# Patient Record
Sex: FEMALE | Race: OTHER | Employment: FULL TIME | URBAN - NONMETROPOLITAN AREA
[De-identification: names, ages, dates, MRNs, and addresses within clinical notes are randomized per-mention and may not be internally consistent; named-entity substitution may affect disease eponyms.]

---
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
## 2021-01-17 ENCOUNTER — HOSPITAL ENCOUNTER (EMERGENCY)
Age: 21
Discharge: HOME | End: 2021-01-17
Attending: FAMILY MEDICINE
Payer: MEDICAID

## 2021-01-17 VITALS
BODY MASS INDEX: 22.99 KG/M2 | TEMPERATURE: 98.1 F | HEART RATE: 88 BPM | SYSTOLIC BLOOD PRESSURE: 114 MMHG | HEIGHT: 60 IN | WEIGHT: 117.1 LBS | RESPIRATION RATE: 18 BRPM | DIASTOLIC BLOOD PRESSURE: 71 MMHG | OXYGEN SATURATION: 97 %

## 2021-01-17 DIAGNOSIS — Z86.69 HX OF MIGRAINES: ICD-10-CM

## 2021-01-17 DIAGNOSIS — B34.9 NONSPECIFIC SYNDROME SUGGESTIVE OF VIRAL ILLNESS: Primary | ICD-10-CM

## 2021-01-17 PROCEDURE — 96372 THER/PROPH/DIAG INJ SC/IM: CPT

## 2021-01-17 PROCEDURE — U0003 INFECTIOUS AGENT DETECTION BY NUCLEIC ACID (DNA OR RNA); SEVERE ACUTE RESPIRATORY SYNDROME CORONAVIRUS 2 (SARS-COV-2) (CORONAVIRUS DISEASE [COVID-19]), AMPLIFIED PROBE TECHNIQUE, MAKING USE OF HIGH THROUGHPUT TECHNOLOGIES AS DESCRIBED BY CMS-2020-01-R: HCPCS

## 2021-01-17 PROCEDURE — 99283 EMERGENCY DEPT VISIT LOW MDM: CPT

## 2021-01-17 PROCEDURE — C9803 HOPD COVID-19 SPEC COLLECT: HCPCS

## 2021-01-17 PROCEDURE — 6360000002 HC RX W HCPCS: Performed by: FAMILY MEDICINE

## 2021-01-17 RX ORDER — KETOROLAC TROMETHAMINE 30 MG/ML
60 INJECTION, SOLUTION INTRAMUSCULAR; INTRAVENOUS ONCE
Status: COMPLETED | OUTPATIENT
Start: 2021-01-17 | End: 2021-01-17

## 2021-01-17 RX ADMIN — KETOROLAC TROMETHAMINE 60 MG: 30 INJECTION, SOLUTION INTRAMUSCULAR at 19:13

## 2021-01-17 ASSESSMENT — PAIN DESCRIPTION - DESCRIPTORS: DESCRIPTORS: ACHING

## 2021-01-17 ASSESSMENT — ENCOUNTER SYMPTOMS
DIARRHEA: 0
VOMITING: 1
COUGH: 1

## 2021-01-17 ASSESSMENT — PAIN DESCRIPTION - LOCATION: LOCATION: HEAD

## 2021-01-18 ENCOUNTER — CARE COORDINATION (OUTPATIENT)
Dept: CARE COORDINATION | Age: 21
End: 2021-01-18

## 2021-01-18 NOTE — CARE COORDINATION
ED visit for viral illness, migraine history. Still has some sore throat but denied all other symptoms she had yesterday (chills, weakness, headache, fatigue, n/v, cough). She is quarantining until [facility] results back. Oral fluids encouraged. Discussed symptoms that require return to ED. Has no PCP and declined offer to schedule appt or info to find new PCP. Patient contacted regarding COVID-19 symptoms. Discussed COVID-19 related testing which was pending at this time. Test results were pending. Patient informed of results, if available? NA-still pending. Care Transition Nurse/ Ambulatory Care Manager contacted the patient by telephone to perform post discharge assessment. Call within 2 business days of discharge: Yes. Verified name and  with patient as identifiers. Provided introduction to self, and explanation of the CTN/ACM role, and reason for call due to risk factors for infection and/or exposure to COVID-19. Symptoms reviewed with patient who verbalized the following symptoms: sore throat. Due to no new or worsening symptoms encounter was not routed to provider for escalation. Discussed follow-up appointments. If no appointment was previously scheduled, appointment scheduling offered: Yes  [facility] follow up appointment(s): No future appointments. Non-[facility] follow up appointment(s): NA    Non-face-to-face services provided:  Reviewed and followed up on pending diagnostic tests and treatments-COVID test  Education of patient/family/caregiver/guardian to support self-management-symptom monitoring and treatment     Advance Care Planning:   Does patient have an Advance Directive:  reviewed and current. Patient has following risk factors of: no known risk factors. CTN/ACM reviewed discharge instructions, medical action plan and red flags such as increased shortness of breath, increasing fever and signs of decompensation with patient who verbalized understanding.    Discussed exposure protocols and quarantine with CDC Guidelines What to do if you are sick with coronavirus disease 2019.  Patient was given an opportunity for questions and concerns. The patient agrees to contact the [facility] exposure line [phone], local [facility] department [place] [facility]: ([phone]) and PCP office for questions related to their healthcare. CTN/ACM provided contact information for future needs. Reviewed and educated patient on any new and changed medications related to discharge diagnosis     Patient/family/caregiver given information for Get[facility] Loop and agrees to enroll yes  Patient's preferred e-mail: NA   Patient's preferred phone number: [phone]  Based on Loop alert triggers, patient will be contacted by nurse care manager for worsening symptoms. Pt will be further monitored by COVID Loop Team based on severity of symptoms and risk factors.

## 2021-01-18 NOTE — ED PROVIDER NOTES
is 98.1 °F (36.7 °C). Her blood pressure is 114/71 and her pulse is 88. Her respiration is 18 and oxygen saturation is 97%. Physical Exam   Constitutional: Patient is oriented to person, place, and time. Patient appears well-developed and well-nourished. Patient is active and cooperative. HENT:   Head: Normocephalic and atraumatic. Head is without contusion. Right Ear: Hearing and external ear normal. No drainage. TM obscured by cerumen  Left Ear: Hearing and external ear normal. No drainage. Clear TM  Nose: Nose normal. No nasal deformity. No epistaxis. Mouth/Throat: Mucous membranes are not dry. Negative oral lesions or exudate, noted some posterior pharyngeal erythema and cobblestoning, some postnasal drainage  Eyes: EOMI. Conjunctivae, sclera, and lids are normal. Right eye exhibits no discharge. Left eye exhibits no discharge. Neck: Full passive range of motion without pain and phonation normal.   Cardiovascular:  Normal rate, regular rhythm and intact distal pulses. No edema. Pulses: Right radial pulse  2+   Pulmonary/Chest: Effort normal. No tachypnea and no bradypnea. No wheezes, rhonchi, or rales. Abdominal: Soft. Patient without distension   Musculoskeletal:   Negative acute trauma or deformity,  apparent full range of motion and normal strength all extremities appropriate to age. Neurological: Patient is alert and oriented to person, place, and time. patient displays no tremor. Patient displays no seizure activity. Normal observed gait  Skin: Skin is warm and dry. Patient is not diaphoretic. Psychiatric: Patient has a normal mood and affect.  Patient speech is normal and behavior is normal. Cognition and memory are normal.    DIFFERENTIAL DIAGNOSIS:   Migraine/cephalalgia, sinusitis, viral illness NOS    DIAGNOSTIC RESULTS         RADIOLOGY: non-plain film images(s) such as CT, Ultrasound and MRI are read by the radiologist.  No orders to display       LABS:   Labs Reviewed   COVID-19 EMERGENCY DEPARTMENT COURSE:   Vitals:    Vitals:    01/17/21 1854   BP: 114/71   Pulse: 88   Resp: 18   Temp: 98.1 °F (36.7 °C)   TempSrc: Oral   SpO2: 97%   Weight: 117 lb 1.6 oz (53.1 kg)   Height: 5' (1.524 m)     Patient history and physical exam taken at bedside, discussed patient symptoms and exam findings, discussed patient's concerns for possible COVID-19, I do note patient has normal pulse oximetry with good waveform, afebrile without antipyretics potentially lowering her temperature, patient is neither tachypneic nor tachycardic, no increased work of breathing. Discussed getting Covid swab will be outpatient at this time, with expected results in 3 to 5 days, in the interim patient is advised to self isolate, discussed Tylenol for fevers, OTC cough cold medications with may be beneficial, patient acknowledges and agrees. This patient has a history of migraines, although she feels this is more of a headache and indicates an area between her nose and slight behind her eyes, will give ketorolac 60 mg IM for headache/history of migraines, patient agreeable to this. FINAL IMPRESSION      1. Nonspecific syndrome suggestive of viral illness    2. Hx of migraines          DISPOSITION/PLAN   D/c    PATIENT REFERRED TO:  No follow-up provider specified. DISCHARGE MEDICATIONS:  Discharge Medication List as of 1/17/2021  7:20 PM              Summation      Patient Course:  D/c    ED Medications administered this visit:    Medications   ketorolac (TORADOL) injection 60 mg (60 mg Intramuscular Given 1/17/21 1913)       New Prescriptions from this visit:    Discharge Medication List as of 1/17/2021  7:20 PM          Follow-up:  No follow-up provider specified. Final Impression:   1. Nonspecific syndrome suggestive of viral illness    2.  Hx of migraines               (Please note that portions of this note were completed with a voice recognition program.  Efforts were made to edit the dictations but occasionally words are mis-transcribed.)    MD Mckenzie Crespo MD  01/17/21 2010

## 2021-01-20 LAB — SARS-COV-2, NAA: NOT DETECTED

## 2021-03-13 ENCOUNTER — HOSPITAL ENCOUNTER (EMERGENCY)
Age: 21
Discharge: HOME | End: 2021-03-13
Attending: FAMILY MEDICINE
Payer: MEDICAID

## 2021-03-13 VITALS
HEIGHT: 60 IN | OXYGEN SATURATION: 99 % | SYSTOLIC BLOOD PRESSURE: 99 MMHG | HEART RATE: 75 BPM | DIASTOLIC BLOOD PRESSURE: 60 MMHG | WEIGHT: 127.7 LBS | TEMPERATURE: 98.2 F | BODY MASS INDEX: 25.07 KG/M2 | RESPIRATION RATE: 20 BRPM

## 2021-03-13 DIAGNOSIS — R42 DIZZINESS: Primary | ICD-10-CM

## 2021-03-13 DIAGNOSIS — Z20.822 ENCOUNTER FOR LABORATORY TESTING FOR COVID-19 VIRUS: ICD-10-CM

## 2021-03-13 PROCEDURE — U0005 INFEC AGEN DETEC AMPLI PROBE: HCPCS

## 2021-03-13 PROCEDURE — 99284 EMERGENCY DEPT VISIT MOD MDM: CPT

## 2021-03-13 PROCEDURE — U0003 INFECTIOUS AGENT DETECTION BY NUCLEIC ACID (DNA OR RNA); SEVERE ACUTE RESPIRATORY SYNDROME CORONAVIRUS 2 (SARS-COV-2) (CORONAVIRUS DISEASE [COVID-19]), AMPLIFIED PROBE TECHNIQUE, MAKING USE OF HIGH THROUGHPUT TECHNOLOGIES AS DESCRIBED BY CMS-2020-01-R: HCPCS

## 2021-03-13 NOTE — LETTER
NOTIFICATION RETURN TO WORK / SCHOOL    3/13/2021    Ms. Konrad Petit  [place] [place] [address]      To Whom It May Concern:    Konrad Petit was tested for COVID-19 on March 13, 2021. She may return to work pending her results. If there are questions or concerns, please have the patient contact our office.         Sincerely,      Darlene Cosme RN

## 2021-03-13 NOTE — ED PROVIDER NOTES
[facility]  eMERGENCY dEPARTMENT eNCOUnter          [address] [facility]       Chief Complaint   Patient presents with    Dizziness     Pt states for the past hour she has been dizzy/seeing black spots at [facility] & she was told to come into the ER for a Covid test.        Nurses Notes reviewed and I agree except as noted in the HPI. HISTORY OF PRESENT ILLNESS    Shary Severe is a 24 y.o. female who presents the emergency room via private vehicle, patient states she was at work became somewhat dizzy, described a lightheaded sensation, denies any vertigo type symptoms, states at times especially when she got up she saw some black spots in her vision. Denies loss of consciousness denies vomiting or diarrhea. Patient thinks it is because she stands up or 10 hours a day in the same position. Denies any cough fever, denies possibility of pregnancy. Patient also states she was told by her work that she has to get Covid testing, that she would have to get the outpatient swab, denies any Covid symptoms otherwise. REVIEW OF SYSTEMS     Review of Systems   HENT: Negative for tinnitus. Eyes: Negative for visual disturbance. Neurological: Positive for dizziness. Negative for seizures and syncope. All other systems reviewed and are negative. PAST MEDICAL HISTORY    has no past medical history on file. SURGICAL HISTORY      has a past surgical history that includes  section. CURRENT MEDICATIONS       Previous Medications    No medications on file       ALLERGIES     has No Known Allergies. FAMILY HISTORY     has no family status information on file. family history is not on file. SOCIAL HISTORY      reports that she is a non-smoker but has been exposed to tobacco smoke. She has never used smokeless tobacco. She reports previous alcohol use. PHYSICAL EXAM     INITIAL VITALS:  height is 5' (1.524 m) and weight is 127 lb 11.2 oz (57.9 kg).  Her oral temperature is 98.2 °F (36.8 °C). Her blood pressure is 96/61 and her pulse is 67. Her respiration is 20 and oxygen saturation is 99%. Physical Exam   Constitutional: Patient is oriented to person, place, and time. Patient appears well-developed and well-nourished. Patient is active and cooperative. HENT:   Head: Normocephalic and atraumatic. Head is without contusion. Right Ear: Hearing and external ear normal. No drainage. Left Ear: Hearing and external ear normal. No drainage. Nose: Nose normal. No nasal deformity. No epistaxis. Mouth/Throat: Mucous membranes are not dry. Eyes: EOMI. Conjunctivae, sclera, and lids are normal. Right eye exhibits no discharge. Left eye exhibits no discharge. No nystagmus  Neck: Full passive range of motion without pain and phonation normal.   Cardiovascular:  Normal rate, regular rhythm and intact distal pulses. Pulses: Right radial pulse  2+   Pulmonary/Chest: Effort normal. No tachypnea and no bradypnea. No wheezes, rhonchi, or rales. Abdominal: Soft. Patient without distension or tenderness, there is a well-healed LTCS scar  Musculoskeletal:   Negative acute trauma or deformity,  apparent full range of motion and normal strength all extremities appropriate to age. Neurological: Patient is alert and oriented to person, place, and time. patient displays no tremor. Patient displays no seizure activity. Normal observed gait  Skin: Skin is warm and dry. Patient is not diaphoretic. Psychiatric: Patient has a normal mood and affect.  Patient speech is normal and behavior is normal. Cognition and memory are normal.    DIFFERENTIAL DIAGNOSIS:   Dehydration, orthostatic hypotension/dizziness    DIAGNOSTIC RESULTS           RADIOLOGY: non-plain film images(s) such as CT, Ultrasound and MRI are read by the radiologist.  No orders to display       LABS:   Labs Reviewed   COVID-19       EMERGENCY DEPARTMENT COURSE:   Vitals:    Vitals:    03/13/21 3397 03/13/21 8341 03/13/21 0559 03/13/21 0600   BP: (!) 108/59 104/64 100/61 96/61   Pulse: 67      Resp: 20      Temp: 98.2 °F (36.8 °C)      TempSrc: Oral      SpO2: 99%      Weight: 127 lb 11.2 oz (57.9 kg)      Height: 5' (1.524 m)        Patient history and physical exam taken at bedside, discussed patient symptoms and exam findings, discussed ability to get orthostatic vital signs and reevaluate, will get oral swab for Covid testing, explained that we would have to use sign of testing as patient does not meet criteria for rapid test with good pulse oximetry and no tachypnea or increased work of breathing, patient states she was told that she would have to have send out testing as well. Patient sitting upright in bed position of comfort, acknowledges    Orthostatic vital signs reviewed    This time for an otherwise healthy young woman, normal the static vital signs, normal physical exam findings, do not feel additional testing bruising warrant, I suspect patient likely not drinking of fluids at work, may be locking her knees when she stands, this was discussed with importance of hydration as well as eating spaced out meals out the day, not locking knees at work, at this time we will go ahead and discharge with outpatient follow-up, return to ER symptoms change worsen or other concerns, Covid testing results will be called the patient as well as posted to her [employer] system. FINAL IMPRESSION      1. Dizziness    2.  Encounter for laboratory testing for COVID-19 virus          DISPOSITION/PLAN   D/c    PATIENT REFERRED TO:  [facility] ED  [address] [address] [phone]    As needed      DISCHARGE MEDICATIONS:  New Prescriptions    No medications on file           Summation      Patient Course: d/c    ED Medications administered this visit:  Medications - No data to display    New Prescriptions from this visit:    New Prescriptions    No medications on file       Follow-up:  [facility] ED  [address] [facility] [address]  [phone]    As needed        Final Impression:   1. Dizziness    2.  Encounter for laboratory testing for COVID-19 virus               (Please note that portions of this note were completed with a voice recognition program.  Efforts were made to edit the dictations but occasionally words are mis-transcribed.)    MD Josie Cruz MD  03/13/21 0422

## 2021-03-15 ENCOUNTER — CARE COORDINATION (OUTPATIENT)
Dept: CARE COORDINATION | Age: 21
End: 2021-03-15

## 2021-03-15 LAB
SARS-COV-2: NORMAL
SARS-COV-2: NOT DETECTED
SOURCE: NORMAL

## 2021-03-15 NOTE — CARE COORDINATION
Reason For Call Today:  -Attempted to reach [facility] today for ED Follow Up/ COVID at risk monitoring following her ED visit to [facility] on 3/13/2021   -Female answered phone, however when ACAMY asked to speak with [facility] she said \"no its not\" call then ended. Only phone number listed for patient   -COVID: results pending at time of call   -My Chart:  Active     No further calls planned at this time due to invalid phone number

## 2021-09-06 ENCOUNTER — APPOINTMENT (OUTPATIENT)
Dept: GENERAL RADIOLOGY | Age: 21
End: 2021-09-06
Payer: MEDICAID

## 2021-09-06 ENCOUNTER — HOSPITAL ENCOUNTER (EMERGENCY)
Age: 21
Discharge: HOME | End: 2021-09-06
Attending: FAMILY MEDICINE
Payer: MEDICAID

## 2021-09-06 VITALS
HEART RATE: 77 BPM | OXYGEN SATURATION: 97 % | DIASTOLIC BLOOD PRESSURE: 95 MMHG | BODY MASS INDEX: 24.39 KG/M2 | TEMPERATURE: 99.5 F | SYSTOLIC BLOOD PRESSURE: 115 MMHG | WEIGHT: 124.9 LBS | RESPIRATION RATE: 16 BRPM

## 2021-09-06 DIAGNOSIS — J20.9 ACUTE BRONCHITIS, UNSPECIFIED ORGANISM: Primary | ICD-10-CM

## 2021-09-06 DIAGNOSIS — Z20.822 LAB TEST NEGATIVE FOR COVID-19 VIRUS: ICD-10-CM

## 2021-09-06 LAB
ABSOLUTE EOS #: 0.2 K/UL (ref 0–0.4)
ABSOLUTE IMMATURE GRANULOCYTE: ABNORMAL K/UL (ref 0–0.3)
ABSOLUTE LYMPH #: 2.4 K/UL (ref 1.2–5.2)
ABSOLUTE MONO #: 0.6 K/UL (ref 0–1)
ALBUMIN SERPL-MCNC: 4.7 G/DL (ref 3.5–5.2)
ALBUMIN/GLOBULIN RATIO: ABNORMAL (ref 1–2.5)
ALP BLD-CCNC: 64 U/L (ref 35–104)
ALT SERPL-CCNC: 13 U/L (ref 5–33)
ANION GAP SERPL CALCULATED.3IONS-SCNC: 11 MMOL/L (ref 9–17)
AST SERPL-CCNC: 17 U/L
BASOPHILS # BLD: 0 % (ref 0–2)
BASOPHILS ABSOLUTE: 0 K/UL (ref 0–0.2)
BILIRUB SERPL-MCNC: <0.1 MG/DL (ref 0.3–1.2)
BUN BLDV-MCNC: 13 MG/DL (ref 6–20)
BUN/CREAT BLD: 24 (ref 9–20)
CALCIUM SERPL-MCNC: 10.1 MG/DL (ref 8.6–10.4)
CHLORIDE BLD-SCNC: 103 MMOL/L (ref 98–107)
CO2: 26 MMOL/L (ref 20–31)
CREAT SERPL-MCNC: 0.55 MG/DL (ref 0.5–0.9)
D-DIMER QUANTITATIVE: 0.28 MG/L FEU (ref 0–0.59)
DIFFERENTIAL TYPE: YES
DIRECT EXAM: NORMAL
EOSINOPHILS RELATIVE PERCENT: 2 % (ref 0–5)
GFR AFRICAN AMERICAN: >60 ML/MIN
GFR NON-AFRICAN AMERICAN: >60 ML/MIN
GFR SERPL CREATININE-BSD FRML MDRD: ABNORMAL ML/MIN/{1.73_M2}
GFR SERPL CREATININE-BSD FRML MDRD: ABNORMAL ML/MIN/{1.73_M2}
GLUCOSE BLD-MCNC: 80 MG/DL (ref 70–99)
HCT VFR BLD CALC: 39.9 % (ref 36–46)
HEMOGLOBIN: 13.7 G/DL (ref 12–16)
IMMATURE GRANULOCYTES: ABNORMAL %
LYMPHOCYTES # BLD: 23 % (ref 15–40)
Lab: NORMAL
MCH RBC QN AUTO: 30.4 PG (ref 26–34)
MCHC RBC AUTO-ENTMCNC: 34.4 G/DL (ref 31–37)
MCV RBC AUTO: 88.3 FL (ref 80–100)
MONOCYTES # BLD: 5 % (ref 4–8)
MONONUCLEOSIS SCREEN: NEGATIVE
NRBC AUTOMATED: ABNORMAL PER 100 WBC
PDW BLD-RTO: 13.8 % (ref 12.1–15.2)
PLATELET # BLD: 279 K/UL (ref 140–450)
PLATELET ESTIMATE: ABNORMAL
PMV BLD AUTO: ABNORMAL FL (ref 6–12)
POTASSIUM SERPL-SCNC: 4.8 MMOL/L (ref 3.7–5.3)
RBC # BLD: 4.52 M/UL (ref 4–5.2)
RBC # BLD: ABNORMAL 10*6/UL
SARS-COV-2, RAPID: NOT DETECTED
SEG NEUTROPHILS: 70 % (ref 47–75)
SEGMENTED NEUTROPHILS ABSOLUTE COUNT: 7.2 K/UL (ref 2.5–7)
SODIUM BLD-SCNC: 140 MMOL/L (ref 135–144)
SPECIMEN DESCRIPTION: NORMAL
SPECIMEN DESCRIPTION: NORMAL
TOTAL PROTEIN: 7.5 G/DL (ref 6.4–8.3)
TROPONIN INTERP: NORMAL
TROPONIN T: NORMAL NG/ML
TROPONIN, HIGH SENSITIVITY: <6 NG/L (ref 0–14)
WBC # BLD: 10.3 K/UL (ref 4.5–13.5)
WBC # BLD: ABNORMAL 10*3/UL

## 2021-09-06 PROCEDURE — 86308 HETEROPHILE ANTIBODY SCREEN: CPT

## 2021-09-06 PROCEDURE — 85379 FIBRIN DEGRADATION QUANT: CPT

## 2021-09-06 PROCEDURE — 99283 EMERGENCY DEPT VISIT LOW MDM: CPT

## 2021-09-06 PROCEDURE — 87635 SARS-COV-2 COVID-19 AMP PRB: CPT

## 2021-09-06 PROCEDURE — 80053 COMPREHEN METABOLIC PANEL: CPT

## 2021-09-06 PROCEDURE — 85025 COMPLETE CBC W/AUTO DIFF WBC: CPT

## 2021-09-06 PROCEDURE — 87880 STREP A ASSAY W/OPTIC: CPT

## 2021-09-06 PROCEDURE — 71045 X-RAY EXAM CHEST 1 VIEW: CPT

## 2021-09-06 PROCEDURE — 36415 COLL VENOUS BLD VENIPUNCTURE: CPT

## 2021-09-06 PROCEDURE — 84484 ASSAY OF TROPONIN QUANT: CPT

## 2021-09-06 ASSESSMENT — PAIN DESCRIPTION - LOCATION: LOCATION: THROAT

## 2021-09-06 ASSESSMENT — PAIN SCALES - GENERAL: PAINLEVEL_OUTOF10: 7

## 2021-09-06 ASSESSMENT — PAIN DESCRIPTION - ONSET: ONSET: ON-GOING

## 2021-09-06 ASSESSMENT — PAIN DESCRIPTION - DESCRIPTORS: DESCRIPTORS: SHARP

## 2021-09-06 ASSESSMENT — PAIN DESCRIPTION - PAIN TYPE: TYPE: ACUTE PAIN

## 2021-09-06 ASSESSMENT — PAIN DESCRIPTION - FREQUENCY: FREQUENCY: CONTINUOUS

## 2021-09-06 NOTE — LETTER
[facility] ED  [address], [address]  Phone: [phone]               September 6, 2021    Patient: Amanda Fung   YOB: 2000   Date of Visit: 9/6/2021       To Whom It May Concern:    Amanda Fung was seen and treated in our emergency department on 9/6/2021. She may return to work on 9/8/2021.       Sincerely,       Keiry John RN         Signature:__________________________________

## 2021-09-07 ASSESSMENT — ENCOUNTER SYMPTOMS
NAUSEA: 0
VOMITING: 0
COUGH: 1
RHINORRHEA: 1
SORE THROAT: 1
DIARRHEA: 0

## 2021-09-07 NOTE — ED PROVIDER NOTES
[address] [facility]  eMERGENCY dEPARTMENT eNCOUnter          [address] [facility]       Chief Complaint   Patient presents with    Cough     Pt has had a cough, runny nose, and sore throat x 1 day. Nurses Notes reviewed and I agree except as noted in the HPI. HISTORY OF PRESENT ILLNESS    Ana South is a 22 y.o. female who presents to the emergency room via private vehicle, patient complaining of cough, sore throat, rhinorrhea for the past day, some chest discomfort, described occasional pains, denies any loss of smell or taste states occasional headache denies nausea vomiting diarrhea. No known sick contacts. No known cardiac history. No history of blood clots or known family history of blood clots, however patient is noted to be a smoker and is on Depo-Provera birth control. Denies trauma or falls. REVIEW OF SYSTEMS     Review of Systems   HENT: Positive for rhinorrhea and sore throat. Negative anosmia, negative ageusia   Respiratory: Positive for cough. Gastrointestinal: Negative for diarrhea, nausea and vomiting. All other systems reviewed and are negative. PAST MEDICAL HISTORY    has no past medical history on file. SURGICAL HISTORY      has a past surgical history that includes  section. CURRENT MEDICATIONS     There are no discharge medications for this patient. ALLERGIES     has No Known Allergies. FAMILY HISTORY     has no family status information on file. family history is not on file. SOCIAL HISTORY      reports that she is a non-smoker but has been exposed to tobacco smoke. She has never used smokeless tobacco. She reports previous alcohol use. PHYSICAL EXAM     INITIAL VITALS:  weight is 124 lb 14.4 oz (56.7 kg). Her oral temperature is 99.5 °F (37.5 °C). Her blood pressure is 115/95 (abnormal) and her pulse is 77. Her respiration is 16 and oxygen saturation is 97%.     Physical Exam   Constitutional: Patient is oriented to person, place, and time. Patient appears well-developed and well-nourished. Patient is active and cooperative. HENT:   Head: Normocephalic and atraumatic. Head is without contusion. Right Ear: Hearing and external ear normal. No drainage. Left Ear: Hearing and external ear normal. No drainage. Nose: Nose normal. No nasal deformity. No epistaxis. Mouth/Throat: Mucous membranes are not dry. Negative oral lesions or exudate  Eyes: EOMI. Conjunctivae, sclera, and lids are normal. Right eye exhibits no discharge. Left eye exhibits no discharge. Neck: Full passive range of motion without pain and phonation normal.   Cardiovascular:  Normal rate, regular rhythm and intact distal pulses. No edema. Negative Homans' sign  Pulses: Right radial pulse  2+   Pulmonary/Chest: Effort normal. No tachypnea and no bradypnea. No wheezes, rhonchi, or rales. Abdominal: Soft. Patient without distension or tenderness  Musculoskeletal:   Negative acute trauma or deformity,  apparent full range of motion and normal strength all extremities appropriate to age. Neurological: Patient is alert and oriented to person, place, and time. patient displays no tremor. Patient displays no seizure activity. Skin: Skin is warm and dry. Patient is not diaphoretic. Psychiatric: Patient has a normal mood and affect. Patient speech is normal and behavior is normal. Cognition and memory are normal.    DIFFERENTIAL DIAGNOSIS:   Pneumonia bronchitis URI, viral illness NOS, PE,    DIAGNOSTIC RESULTS         RADIOLOGY: non-plain film images(s) such as CT, Ultrasound and MRI are read by the radiologist.  XR CHEST PORTABLE   Final Result      No focal consolidation, pneumothorax or pleural effusion. Mild bilateral peribronchial thickening is suggested as can be seen with    bronchitis/bronchiolitis, viral pneumonitis or reactive airways disease.                       LABS:   Labs Reviewed   CBC WITH AUTO DIFFERENTIAL - Abnormal; Notable for the following components:       Result Value    Segs Absolute 7.20 (*)     All other components within normal limits   COMPREHENSIVE METABOLIC PANEL W/ REFLEX TO MG FOR LOW K - Abnormal; Notable for the following components:    Bun/Cre Ratio 24 (*)     Total Bilirubin <0.10 (*)     All other components within normal limits   COVID-19, RAPID   STREP SCREEN GROUP A THROAT   MONONUCLEOSIS SCREEN   D-DIMER, QUANTITATIVE   TROPONIN       EMERGENCY DEPARTMENT COURSE:   Vitals:    Vitals:    09/06/21 2101   BP: (!) 115/95   Pulse: 77   Resp: 16   Temp: 99.5 °F (37.5 °C)   TempSrc: Oral   SpO2: 97%   Weight: 124 lb 14.4 oz (56.7 kg)     Patient history and physical exam taken at bedside, discussed patient symptoms and exam findings, discussed initial work-up to include blood work, oral swabs, chest x-ray. Patient sitting bed semi-Fowlers, acknowledged    Chest x-ray reviewed    Lab work-up reviewed    COVID-19 swab negative    Discussed with patient overall work-up, discussed symptoms consistent with likely acute bronchitis, discussed this is a viral infection, discussed OTC cough cold medications as may be of benefit, follow-up with primary care will refer to same, return to ER if any symptoms change worsen or concerns, acknowledged    FINAL IMPRESSION      1. Acute bronchitis, unspecified organism    2. Lab test negative for COVID-19 virus          DISPOSITION/PLAN   D/c    PATIENT REFERRED TO:  [facility]IMARY [facility] [place]  [address] [address]  [phone]  Call       [facility] ED  [address] [address]  [phone]    As needed, If symptoms worsen      DISCHARGE MEDICATIONS:  There are no discharge medications for this patient.           Summation      Patient Course: d/c  ED Medications administered this visit:  Medications - No data to display    New Prescriptions from this visit:  There are no discharge medications for this patient. Follow-up:  [facility] PRIMARY CARE [place]  [address] [facility] [address] [phone]  Call       [facility]  [address] [facility] [address] [phone]    As needed, If symptoms worsen        Final Impression:   1. Acute bronchitis, unspecified organism    2.  Lab test negative for COVID-19 virus               (Please note that portions of this note were completed with a voice recognition program.  Efforts were made to edit the dictations but occasionally words are mis-transcribed.)    MD Lilia Escobedo MD  09/07/21 9855

## 2021-10-20 ENCOUNTER — HOSPITAL ENCOUNTER (EMERGENCY)
Age: 21
Discharge: HOME | End: 2021-10-20
Attending: EMERGENCY MEDICINE

## 2021-10-20 VITALS
DIASTOLIC BLOOD PRESSURE: 72 MMHG | RESPIRATION RATE: 16 BRPM | SYSTOLIC BLOOD PRESSURE: 121 MMHG | HEART RATE: 85 BPM | OXYGEN SATURATION: 95 %

## 2021-10-20 DIAGNOSIS — M65.4 DE QUERVAIN'S DISEASE (TENOSYNOVITIS): Primary | ICD-10-CM

## 2021-10-20 PROCEDURE — 99283 EMERGENCY DEPT VISIT LOW MDM: CPT

## 2021-10-20 PROCEDURE — 29125 APPL SHORT ARM SPLINT STATIC: CPT

## 2021-10-20 RX ORDER — KETOROLAC TROMETHAMINE 10 MG/1
10 TABLET, FILM COATED ORAL EVERY 8 HOURS PRN
Qty: 15 TABLET | Refills: 0 | Status: SHIPPED | OUTPATIENT
Start: 2021-10-20 | End: 2021-11-03

## 2021-10-20 NOTE — LETTER
Chloé Deras was seen and treated in our emergency department on 10/20/21. She may return to work on 10/21/21    If you have any questions or concerns, please don't hesitate to call.       Terrence Manriquez RN

## 2021-10-20 NOTE — ED PROVIDER NOTES
HPI:  10/20/21,   Time: 12:12 PM EDT         Denise Allen is a 22 y.o. female presenting to the ED for right wrist pain over the lateral aspect of the wrist, beginning last few months but worse over the last few days ago. The complaint has been constant, moderate in severity, and worsened by changing position. And movement of right wrist especially eversion of the wrist.  States she performs repetitious movements with her hands at work    ROS:   Pertinent positives and negatives are stated within HPI, all other systems reviewed and are negative.  --------------------------------------------- PAST HISTORY ---------------------------------------------  Past Medical History:  has no past medical history on file. Past Surgical History:  has a past surgical history that includes  section. Social History:  reports that she is a non-smoker but has been exposed to tobacco smoke. She has never used smokeless tobacco. She reports previous alcohol use. Family History: family history is not on file. The patients home medications have been reviewed. Allergies: Patient has no known allergies. -------------------------------------------------- RESULTS -------------------------------------------------  All laboratory and radiology results have been personally reviewed by myself   LABS:  No results found for this visit on 10/20/21. RADIOLOGY:  Interpreted by Radiologist.  No orders to display       ------------------------- NURSING NOTES AND VITALS REVIEWED ---------------------------   The nursing notes within the ED encounter and vital signs as below have been reviewed.    /72   Pulse 85   Resp 16   SpO2 95%   Oxygen Saturation Interpretation: Normal      ---------------------------------------------------PHYSICAL EXAM--------------------------------------      Constitutional/General: Alert and oriented x3, well appearing, non toxic in NAD  Head: NC/AT  Eyes: PERRL, EOMI  Mouth: Oropharynx clear, handling secretions, no trismus  Neck: Supple, full ROM, no meningeal signs  Pulmonary: Lungs clear to auscultation bilaterally, no wheezes, rales, or rhonchi. Not in respiratory distress  Cardiovascular:  Regular rate and rhythm, no murmurs, gallops, or rubs. 2+ distal pulses  Abdomen: Soft, non tender, non distended,   Extremities: Moves all extremities x 4. Warm and well perfused, right wrist tenderness to palpation of the lateral aspect of the wrist and positive Finkelstein's test insistent with the Quervain's tenosynovitis  Skin: warm and dry without rash  Neurologic: GCS 15,  Psych: Normal Affect      ------------------------------ ED COURSE/MEDICAL DECISION MAKING----------------------  Medications - No data to display      Medical Decision Making:    Salena's    Counseling: The emergency provider has spoken with the patient and discussed todays results, in addition to providing specific details for the plan of care and counseling regarding the diagnosis and prognosis.   Questions are answered at this time and they are agreeable with the plan.      --------------------------------- IMPRESSION AND DISPOSITION ---------------------------------    IMPRESSION  1. De Quervain's disease (tenosynovitis) New Problem       DISPOSITION  Disposition: Discharge to home  Patient condition is stable                  Isabel Becker MD  10/20/21 4770

## 2021-11-03 ENCOUNTER — HOSPITAL ENCOUNTER (EMERGENCY)
Age: 21
Discharge: HOME | End: 2021-11-03
Attending: FAMILY MEDICINE
Payer: MEDICAID

## 2021-11-03 VITALS
OXYGEN SATURATION: 94 % | WEIGHT: 126 LBS | SYSTOLIC BLOOD PRESSURE: 109 MMHG | TEMPERATURE: 98.5 F | DIASTOLIC BLOOD PRESSURE: 69 MMHG | HEART RATE: 65 BPM | BODY MASS INDEX: 24.74 KG/M2 | HEIGHT: 60 IN | RESPIRATION RATE: 16 BRPM

## 2021-11-03 DIAGNOSIS — Z20.822 LAB TEST NEGATIVE FOR COVID-19 VIRUS: ICD-10-CM

## 2021-11-03 DIAGNOSIS — R51.9 ACUTE NONINTRACTABLE HEADACHE, UNSPECIFIED HEADACHE TYPE: Primary | ICD-10-CM

## 2021-11-03 LAB
SARS-COV-2, RAPID: NOT DETECTED
SPECIMEN DESCRIPTION: NORMAL

## 2021-11-03 PROCEDURE — 96374 THER/PROPH/DIAG INJ IV PUSH: CPT

## 2021-11-03 PROCEDURE — 87635 SARS-COV-2 COVID-19 AMP PRB: CPT

## 2021-11-03 PROCEDURE — 96375 TX/PRO/DX INJ NEW DRUG ADDON: CPT

## 2021-11-03 PROCEDURE — C9803 HOPD COVID-19 SPEC COLLECT: HCPCS

## 2021-11-03 PROCEDURE — 6360000002 HC RX W HCPCS: Performed by: FAMILY MEDICINE

## 2021-11-03 PROCEDURE — 99282 EMERGENCY DEPT VISIT SF MDM: CPT

## 2021-11-03 RX ORDER — DIPHENHYDRAMINE HYDROCHLORIDE 50 MG/ML
25 INJECTION INTRAMUSCULAR; INTRAVENOUS ONCE
Status: COMPLETED | OUTPATIENT
Start: 2021-11-03 | End: 2021-11-03

## 2021-11-03 RX ORDER — METOCLOPRAMIDE HYDROCHLORIDE 5 MG/ML
10 INJECTION INTRAMUSCULAR; INTRAVENOUS ONCE
Status: COMPLETED | OUTPATIENT
Start: 2021-11-03 | End: 2021-11-03

## 2021-11-03 RX ORDER — KETOROLAC TROMETHAMINE 30 MG/ML
30 INJECTION, SOLUTION INTRAMUSCULAR; INTRAVENOUS ONCE
Status: COMPLETED | OUTPATIENT
Start: 2021-11-03 | End: 2021-11-03

## 2021-11-03 RX ADMIN — KETOROLAC TROMETHAMINE 30 MG: 30 INJECTION, SOLUTION INTRAMUSCULAR; INTRAVENOUS at 18:53

## 2021-11-03 RX ADMIN — DIPHENHYDRAMINE HYDROCHLORIDE 25 MG: 50 INJECTION, SOLUTION INTRAMUSCULAR; INTRAVENOUS at 18:53

## 2021-11-03 RX ADMIN — METOCLOPRAMIDE 10 MG: 5 INJECTION, SOLUTION INTRAMUSCULAR; INTRAVENOUS at 18:54

## 2021-11-03 ASSESSMENT — PAIN DESCRIPTION - PAIN TYPE: TYPE: ACUTE PAIN

## 2021-11-03 ASSESSMENT — ENCOUNTER SYMPTOMS
SORE THROAT: 0
COUGH: 1
RHINORRHEA: 0

## 2021-11-03 ASSESSMENT — PAIN SCALES - GENERAL
PAINLEVEL_OUTOF10: 6
PAINLEVEL_OUTOF10: 6

## 2021-11-03 ASSESSMENT — PAIN DESCRIPTION - LOCATION: LOCATION: HEAD

## 2021-11-03 ASSESSMENT — PAIN DESCRIPTION - DESCRIPTORS: DESCRIPTORS: ACHING

## 2021-11-03 ASSESSMENT — PAIN DESCRIPTION - FREQUENCY: FREQUENCY: CONTINUOUS

## 2021-11-03 NOTE — LETTER
Tamra Chan was seen and treated in our emergency department on 11/3/2021. She may return to work on 11/6/2021    If you have any questions or concerns, please don't hesitate to call.     Tom Westfall RN

## 2021-11-03 NOTE — ED PROVIDER NOTES
height is 5' (1.524 m) and weight is 126 lb (57.2 kg). Her oral temperature is 98.5 °F (36.9 °C). Her blood pressure is 109/69 and her pulse is 65. Her respiration is 16 and oxygen saturation is 94%. Physical Exam   Constitutional: Patient is oriented to person, place, and time. Patient appears well-developed and well-nourished. Patient is active and cooperative. HENT:   Head: Normocephalic and atraumatic. Head is without contusion. Right Ear: Hearing and external ear normal. No drainage. Left Ear: Hearing and external ear normal. No drainage. Nose: Nose normal. No nasal deformity. No epistaxis. Mouth/Throat: Mucous membranes are not dry. No oral lesions or exudate, no edema noted mild posterior pharyngeal erythema  Eyes: EOMI. Conjunctivae, sclera, and lids are normal. Right eye exhibits no discharge. Left eye exhibits no discharge. Neck: Full passive range of motion without pain and phonation normal.   Cardiovascular:  Normal rate, regular rhythm and intact distal pulses. Pulses: Right radial pulse  2+   Pulmonary/Chest: Effort normal. No tachypnea and no bradypnea. No wheezes, rhonchi, or rales. Abdominal: Soft. Patient without distension or tenderness  Musculoskeletal:   Negative acute trauma or deformity,  apparent full range of motion and normal strength all extremities appropriate to age. Neurological: Patient is alert and oriented to person, place, and time. patient displays no tremor. Patient displays no seizure activity. Normal observed gait. Lymphatic: No cervical lymphadenopathy  Skin: Skin is warm and dry. Patient is not diaphoretic. Psychiatric: Patient has a normal mood and affect.  Patient speech is normal and behavior is normal. Cognition and memory are normal.    DIFFERENTIAL DIAGNOSIS:   Headache NOS, URI, bronchitis, viral illness NOS    DIAGNOSTIC RESULTS           RADIOLOGY: non-plain film images(s) such as CT, Ultrasound and MRI are read by the radiologist.  No orders to display       LABS:   Labs Reviewed   COVID-19, RAPID       EMERGENCY DEPARTMENT COURSE:   Vitals:    Vitals:    11/03/21 1832   BP: 109/69   Pulse: 65   Resp: 16   Temp: 98.5 °F (36.9 °C)   TempSrc: Oral   SpO2: 94%   Weight: 126 lb (57.2 kg)   Height: 5' (1.524 m)     OARRS = 110    Patient history and physical exam taken at bedside, discussed patient symptoms and exam findings, discussed initial plan work-up to include IV access, give IV ketorolac IV Reglan IV diphenhydramine, will give rapid Covid test.  Patient sitting bed semi-Fowlers, acknowledged    Rapid Covid test negative    Follow with patient 30 minutes after received medication, currently rates her headache 3 out of 10, discussed negative Covid test, discussed treatment of headaches at home including Motrin/Tylenol and or other over-the-counter headache medications, stay well-hydrated, follow-up with primary care, return to ER if symptoms change worse other concerns, acknowledged    FINAL IMPRESSION      1. Acute nonintractable headache, unspecified headache type    2. Lab test negative for COVID-19 virus          DISPOSITION/PLAN   D/c    PATIENT [facility]:  [facility]  [phone]    Call         DISCHARGE MEDICATIONS:  New Prescriptions    No medications on file           Summation      Patient Course:  D/c    ED Medications administered this visit:    Medications   ketorolac (TORADOL) injection 30 mg (30 mg IntraVENous Given 11/3/21 1853)   metoclopramide (REGLAN) injection 10 mg (10 mg IntraVENous Given 11/3/21 1854)   diphenhydrAMINE (BENADRYL) injection 25 mg (25 mg IntraVENous Given 11/3/21 1853)       New Prescriptions from this visit:    New Prescriptions    No medications on file       Follow-up:  CAROLINE Barrera  [facility]  [phone]    Call           Final Impression:   1. Acute nonintractable headache, unspecified headache type    2.  Lab test negative for COVID-19

## 2021-11-21 ENCOUNTER — HOSPITAL ENCOUNTER (EMERGENCY)
Age: 21
Discharge: HOME | End: 2021-11-21
Attending: FAMILY MEDICINE
Payer: MEDICAID

## 2021-11-21 VITALS
HEART RATE: 68 BPM | OXYGEN SATURATION: 97 % | TEMPERATURE: 97.3 F | BODY MASS INDEX: 25.39 KG/M2 | WEIGHT: 130 LBS | RESPIRATION RATE: 16 BRPM

## 2021-11-21 DIAGNOSIS — J06.9 VIRAL URI: Primary | ICD-10-CM

## 2021-11-21 LAB
SARS-COV-2, RAPID: NOT DETECTED
SPECIMEN DESCRIPTION: NORMAL

## 2021-11-21 PROCEDURE — 6370000000 HC RX 637 (ALT 250 FOR IP): Performed by: FAMILY MEDICINE

## 2021-11-21 PROCEDURE — 99283 EMERGENCY DEPT VISIT LOW MDM: CPT

## 2021-11-21 PROCEDURE — 87635 SARS-COV-2 COVID-19 AMP PRB: CPT

## 2021-11-21 PROCEDURE — C9803 HOPD COVID-19 SPEC COLLECT: HCPCS

## 2021-11-21 RX ORDER — IBUPROFEN 200 MG
400 TABLET ORAL ONCE
Status: COMPLETED | OUTPATIENT
Start: 2021-11-21 | End: 2021-11-21

## 2021-11-21 RX ADMIN — IBUPROFEN 400 MG: 200 TABLET, FILM COATED ORAL at 17:30

## 2021-11-21 ASSESSMENT — PAIN DESCRIPTION - FREQUENCY: FREQUENCY: CONTINUOUS

## 2021-11-21 ASSESSMENT — PAIN DESCRIPTION - ONSET: ONSET: ON-GOING

## 2021-11-21 ASSESSMENT — PAIN DESCRIPTION - LOCATION: LOCATION: HEAD

## 2021-11-21 ASSESSMENT — PAIN DESCRIPTION - DESCRIPTORS: DESCRIPTORS: ACHING

## 2021-11-21 ASSESSMENT — PAIN DESCRIPTION - PAIN TYPE: TYPE: ACUTE PAIN

## 2021-11-21 ASSESSMENT — PAIN SCALES - GENERAL: PAINLEVEL_OUTOF10: 6

## 2021-11-21 NOTE — ED PROVIDER NOTES
eMERGENCY dEPARTMENT eNCOUnter        [address] [facility]    Chief Complaint   Patient presents with    Concern For COVID-19     Pt C/O lost of taste and smell. Pt C/O headache and sore throat. HPI    Margarita Ulrich is a 22 y.o. female who presents with headache and sore throat for 2 to 3 days. She complains of loss of taste and smell. Occasional cough. Has been in contact with a known Covid patient. No abdominal pain or vomiting. REVIEW OF SYSTEMS        PAST MEDICAL HISTORY    History reviewed. No pertinent past medical history. SURGICAL HISTORY    Past Surgical History:   Procedure Laterality Date     SECTION         CURRENT MEDICATIONS    Denied    ALLERGIES    No Known Allergies    FAMILY HISTORY    History reviewed. No pertinent family history. SOCIAL HISTORY    Social History     Socioeconomic History    Marital status: Single     Spouse name: None    Number of children: None    Years of education: None    Highest education level: None   Occupational History    None   Tobacco Use    Smoking status: Former Smoker     Packs/day: 0.25    Smokeless tobacco: Never Used   Substance and Sexual Activity    Alcohol use: Not Currently    Drug use: Never    Sexual activity: None   Other Topics Concern    None   Social History Narrative    None     Social Determinants of Health     Financial Resource Strain:     Difficulty of Paying Living Expenses: Not on file   Food Insecurity:     Worried About Running Out of Food in the Last Year: Not on file    Joellen of Food in the Last Year: Not on file   Transportation Needs:     Lack of Transportation (Medical): Not on file    Lack of Transportation (Non-Medical):  Not on file   Physical Activity:     Days of Exercise per Week: Not on file    Minutes of Exercise per Session: Not on file   Stress:     Feeling of Stress : Not on file   Social Connections:     Frequency of Communication with Friends and Family: Not on file    Frequency of Social Gatherings with Friends and Family: Not on file    Attends Yazidism Services: Not on file    Active Member of Clubs or Organizations: Not on file    Attends Club or Organization Meetings: Not on file    Marital Status: Not on file   Intimate Partner Violence:     Fear of Current or Ex-Partner: Not on file    Emotionally Abused: Not on file    Physically Abused: Not on file    Sexually Abused: Not on file   Housing Stability:     Unable to Pay for Housing in the Last Year: Not on file    Number of [place] in the Last Year: Not on file    Unstable Housing in the Last Year: Not on file       PHYSICAL EXAM    VITAL SIGNS: Pulse 68   Temp 97.3 °F (36.3 °C) (Temporal)   Resp 16   Wt 130 lb (59 kg)   LMP  (LMP Unknown)   SpO2 97%   BMI 25.39 kg/m²   Constitutional:  Well developed, well nourished, 28-year-old  female with sore throat, no acute distress, non-toxic appearance   Eyes: PERRL, conjunctiva normal, sclera white  HENT: Is normocephalic and no trauma. EACs are patent TMs are intact. No inflammation. Nares are patent with some rhinorrhea but minimal congestion. Oropharynx shows posterior erythema but no exudate. Teeth are in good condition. Tongue and uvula are not enlarged. The neck is supple with trachea midline. Cervical range of motion is nontender. No thyroid tenderness or enlargement. No significant adenopathy. Respiratory: Clear to auscultation bilaterally. Cardiovascular:  Normal rate, normal rhythm, no murmurs, no gallops, no rubs   Musculoskeletal:  No tenderness or swelling of the extremities. Gait is normal.  Spine is grossly aligned and nontender. Integument:  Well hydrated, no generalized, facial or neck rash   Alert and oriented female. No gross motor deficit. Speech is fluent and mentation is appropriate. Labs Reviewed   COVID-19, RAPID         ED COURSE & MEDICAL DECISION MAKING    Pertinent Labs & Imaging studies reviewed.  (See chart for details)    Summation      Patient Course: 78-year-old female with sore throat and loss of taste. Stable exam.  Covid test is negative. Findings are discussed with patient. Viral URI. Symptomatic treatment. Follow-up PCP with any significant changes. Encourage PCP follow-up. ED Medications administered this visit:    Medications   ibuprofen (ADVIL;MOTRIN) tablet 400 mg (has no administration in time range)       New Prescriptions from this visit:    New Prescriptions    No medications on file       Follow-up:  CAROLINE Belcher  [facility]  [phone]    Call in 3 days  If symptoms worsen        Final Impression:   1.  Viral URI               (Please note that portions of this note were completed with a voice recognition program.  Efforts were made to edit the dictations but occasionally words are mis-transcribed.)        Bayron Vargas DO  11/21/21 5128

## 2022-01-14 ENCOUNTER — HOSPITAL ENCOUNTER (EMERGENCY)
Age: 22
Discharge: HOME | End: 2022-01-14
Attending: FAMILY MEDICINE
Payer: MEDICAID

## 2022-01-14 VITALS
TEMPERATURE: 99.6 F | DIASTOLIC BLOOD PRESSURE: 57 MMHG | OXYGEN SATURATION: 97 % | SYSTOLIC BLOOD PRESSURE: 107 MMHG | RESPIRATION RATE: 14 BRPM | HEART RATE: 81 BPM | BODY MASS INDEX: 26.56 KG/M2 | WEIGHT: 136 LBS

## 2022-01-14 DIAGNOSIS — J06.9 URI WITH COUGH AND CONGESTION: Primary | ICD-10-CM

## 2022-01-14 DIAGNOSIS — Z20.822 LAB TEST NEGATIVE FOR COVID-19 VIRUS: ICD-10-CM

## 2022-01-14 LAB
DIRECT EXAM: NORMAL
Lab: NORMAL
SARS-COV-2, RAPID: NOT DETECTED
SPECIMEN DESCRIPTION: NORMAL
SPECIMEN DESCRIPTION: NORMAL

## 2022-01-14 PROCEDURE — 99283 EMERGENCY DEPT VISIT LOW MDM: CPT

## 2022-01-14 PROCEDURE — 87880 STREP A ASSAY W/OPTIC: CPT

## 2022-01-14 PROCEDURE — 87635 SARS-COV-2 COVID-19 AMP PRB: CPT

## 2022-01-14 PROCEDURE — C9803 HOPD COVID-19 SPEC COLLECT: HCPCS

## 2022-01-14 ASSESSMENT — ENCOUNTER SYMPTOMS
COUGH: 1
SORE THROAT: 1
RHINORRHEA: 0

## 2022-01-14 ASSESSMENT — PAIN SCALES - GENERAL: PAINLEVEL_OUTOF10: 6

## 2022-01-14 ASSESSMENT — PAIN DESCRIPTION - PAIN TYPE: TYPE: ACUTE PAIN

## 2022-01-14 ASSESSMENT — PAIN DESCRIPTION - FREQUENCY: FREQUENCY: CONTINUOUS

## 2022-01-14 ASSESSMENT — PAIN DESCRIPTION - LOCATION: LOCATION: HEAD

## 2022-01-14 ASSESSMENT — PAIN DESCRIPTION - DESCRIPTORS: DESCRIPTORS: ACHING

## 2022-01-14 NOTE — LETTER
[facility]  [address]  Phone: [phone]             January 14, 2022    Patient: Adele Griffin   YOB: 2000   Date of Visit: 1/14/2022       To Whom It May Concern:    Adele Griffin was seen and treated in our emergency department on 1/14/2022. She may return to work on 01/15/2022.       Sincerely,             Signature:__________________________________

## 2022-01-14 NOTE — ED PROVIDER NOTES
[address] [facility]  eMERGENCY dEPARTMENT eNCOUnter          [address] [facility]       Chief Complaint   Patient presents with    Pharyngitis     symptoms started 3 days ago     Headache       Nurses Notes reviewed and I agree except as noted in the HPI. HISTORY OF PRESENT ILLNESS    Haroon Rodriguez is a 22 y.o. female who presents to the emergency room via private vehicle, patient complaining of 3 days of sore throat, headache, states yesterday she have a fever T-max 100, little cough with production though thinks may be due to a scratchy throat, some congestion and postnasal drainage though denies any rhinorrhea. Positive sick contacts. Positive smoker, patient not received a flu or COVID-vaccine. PCP: CAROLINE Vega    REVIEW OF SYSTEMS     Review of Systems   Constitutional: Positive for fever (Day prior). HENT: Positive for congestion, postnasal drip and sore throat. Negative for rhinorrhea. Respiratory: Positive for cough. All other systems reviewed and are negative. PAST MEDICAL HISTORY    has no past medical history on file. SURGICAL HISTORY      has a past surgical history that includes  section. CURRENT MEDICATIONS       There are no discharge medications for this patient. ALLERGIES     has No Known Allergies. FAMILY HISTORY     has no family status information on file. family history is not on file. SOCIAL HISTORY      reports that she has been smoking cigarettes. She has been smoking about 0.25 packs per day. She has never used smokeless tobacco. She reports previous alcohol use. She reports that she does not use drugs. PHYSICAL EXAM     INITIAL VITALS:  weight is 136 lb (61.7 kg). Her temperature is 99.6 °F (37.6 °C). Her blood pressure is 107/57 (abnormal) and her pulse is 81. Her respiration is 14 and oxygen saturation is 97%. Physical Exam   Constitutional: Patient is oriented to person, place, and time.  Patient appears well-developed and well-nourished. Patient is active and cooperative. HENT:   Head: Normocephalic and atraumatic. Head is without contusion. Right Ear: Hearing and external ear normal. No drainage. Mild effusion without erythema or bulging  Left Ear: Hearing and external ear normal. No drainage. Mild effusion without erythema or bulging   Nose: Nose normal. No nasal deformity. No epistaxis. Mouth/Throat: Mucous membranes are not dry. Negative for lesions or exudate, noted some posterior pharyngeal erythema, no significant drainage  Eyes: EOMI. Conjunctivae, sclera, and lids are normal. Right eye exhibits no discharge. Left eye exhibits no discharge. Neck: Full passive range of motion without pain and phonation normal.   Cardiovascular:  Normal rate, regular rhythm and intact distal pulses. Pulses: Right radial pulse  2+   Pulmonary/Chest: Effort normal. No tachypnea and no bradypnea. No wheezes, rhonchi, or rales. Abdominal: BMI 26.5, Soft. Patient without distension or tenderness  Musculoskeletal:   Negative acute trauma or deformity,  apparent full range of motion and normal strength all extremities appropriate to age. Neurological: Patient is alert and oriented to person, place, and time. patient displays no tremor. Patient displays no seizure activity. Normal observed gait. Lymphatic: Anterior cervical lymphadenopathy, negative posterior cervical lymphadenopathy  Skin: Skin is warm and dry. Patient is not diaphoretic. Psychiatric: Patient has a normal mood and affect.  Patient speech is normal and behavior is normal. Cognition and memory are normal.    DIFFERENTIAL DIAGNOSIS:   URI, viral pharyngitis, strep throat, viral illness NOS    DIAGNOSTIC RESULTS           RADIOLOGY: non-plain film images(s) such as CT, Ultrasound and MRI are read by the radiologist.  No orders to display       LABS:   Labs Reviewed   COVID-19, RAPID   STREP SCREEN GROUP A THROAT       EMERGENCY DEPARTMENT COURSE: Vitals:    Vitals:    01/14/22 1101   BP: (!) 107/57   Pulse: 81   Resp: 14   Temp: 99.6 °F (37.6 °C)   SpO2: 97%   Weight: 136 lb (61.7 kg)     Patient history and physical exam taken, discussed patient symptoms and exam findings, rapid strep and rapid COVID testing has been ordered and performed by nursing, awaiting results, would like to hold on chest x-ray as patient showing good pulse oximetry and clear lung sounds, would hold on blood work at this time, patient acknowledges. Rapid strep negative, rapid COVID negative    Discussed with patient her test results, noting negative rapid strep and rapid COVID, discussed likely URI with cough congestion, discussed this is a viral illness, discussed OTC cough cold medications, importance of hydration, prevention of spread to others, follow-up with primary care, I do advise patient to stop smoking, patient acknowledges    FINAL IMPRESSION      1. URI with cough and congestion    2. Lab test negative for COVID-19 virus          DISPOSITION/PLAN   D/c    PATIENT REFERRED TO:  CAROLINE Contreras  [facility] [address]  [phone]    Call       [facility]  [address] [facility] [address] [phone]    As needed      DISCHARGE MEDICATIONS:  There are no discharge medications for this patient. Summation      Patient Course:  D/c    ED Medications administered this visit:  Medications - No data to display    New Prescriptions from this visit:    There are no discharge medications for this patient. Follow-up:  CAROLINE Contreras  [facility] [address]  [phone]    Call       [facility]  [address] [facility] [address] [phone]    As needed        Final Impression:   1. URI with cough and congestion    2.  Lab test negative for COVID-19 virus               (Please note that portions of this note were completed with a voice recognition program.  Efforts were made to edit the dictations but occasionally words are mis-transcribed.)    MD Bianka Orellana MD  01/14/22 2599

## 2023-06-05 ENCOUNTER — HOSPITAL ENCOUNTER (EMERGENCY)
Age: 23
Discharge: HOME | End: 2023-06-05
Attending: EMERGENCY MEDICINE
Payer: MEDICAID

## 2023-06-05 VITALS
HEART RATE: 88 BPM | HEIGHT: 60 IN | RESPIRATION RATE: 18 BRPM | OXYGEN SATURATION: 99 % | TEMPERATURE: 97.4 F | WEIGHT: 159.2 LBS | DIASTOLIC BLOOD PRESSURE: 43 MMHG | BODY MASS INDEX: 31.25 KG/M2 | SYSTOLIC BLOOD PRESSURE: 111 MMHG

## 2023-06-05 DIAGNOSIS — J06.9 VIRAL URI WITH COUGH: Primary | ICD-10-CM

## 2023-06-05 PROCEDURE — 99283 EMERGENCY DEPT VISIT LOW MDM: CPT

## 2023-06-05 RX ORDER — ECHINACEA PURPUREA EXTRACT 125 MG
1 TABLET ORAL PRN
Qty: 1 EACH | Refills: 0 | Status: SHIPPED | OUTPATIENT
Start: 2023-06-05

## 2023-06-05 ASSESSMENT — PAIN - FUNCTIONAL ASSESSMENT: PAIN_FUNCTIONAL_ASSESSMENT: NONE - DENIES PAIN

## 2023-07-11 ENCOUNTER — HOSPITAL ENCOUNTER (OUTPATIENT)
Age: 23
Discharge: HOME | End: 2023-07-11
Payer: MEDICAID

## 2023-07-11 LAB
GLUCOSE 1H P 50 G GLC PO SERPL-MCNC: 129 MG/DL (ref 70–135)
GLUCOSE ADMINISTRATION: NORMAL
HCT VFR BLD AUTO: 29.5 % (ref 36–46)
HGB BLD-MCNC: 9.8 G/DL (ref 12–16)

## 2023-07-11 PROCEDURE — 85018 HEMOGLOBIN: CPT

## 2023-07-11 PROCEDURE — 86780 TREPONEMA PALLIDUM: CPT

## 2023-07-11 PROCEDURE — 85014 HEMATOCRIT: CPT

## 2023-07-11 PROCEDURE — 36415 COLL VENOUS BLD VENIPUNCTURE: CPT

## 2023-07-11 PROCEDURE — 82950 GLUCOSE TEST: CPT

## 2023-07-12 LAB — T PALLIDUM AB SER QL IA: NONREACTIVE

## 2023-07-20 ENCOUNTER — HOSPITAL ENCOUNTER (EMERGENCY)
Age: 23
Discharge: HOME | End: 2023-07-20
Attending: EMERGENCY MEDICINE
Payer: MEDICAID

## 2023-07-20 VITALS
WEIGHT: 161.1 LBS | RESPIRATION RATE: 18 BRPM | HEIGHT: 60 IN | OXYGEN SATURATION: 99 % | DIASTOLIC BLOOD PRESSURE: 72 MMHG | SYSTOLIC BLOOD PRESSURE: 116 MMHG | TEMPERATURE: 98.8 F | BODY MASS INDEX: 31.63 KG/M2 | HEART RATE: 81 BPM

## 2023-07-20 DIAGNOSIS — K42.9 UMBILICAL HERNIA WITHOUT OBSTRUCTION AND WITHOUT GANGRENE: Primary | ICD-10-CM

## 2023-07-20 PROCEDURE — 99282 EMERGENCY DEPT VISIT SF MDM: CPT

## 2023-07-20 RX ORDER — FERROUS SULFATE 325(65) MG
325 TABLET ORAL
COMMUNITY

## 2023-07-20 ASSESSMENT — PAIN DESCRIPTION - DESCRIPTORS: DESCRIPTORS: PRESSURE

## 2023-07-20 ASSESSMENT — PAIN SCALES - GENERAL: PAINLEVEL_OUTOF10: 3

## 2023-07-20 ASSESSMENT — LIFESTYLE VARIABLES
HOW MANY STANDARD DRINKS CONTAINING ALCOHOL DO YOU HAVE ON A TYPICAL DAY: PATIENT DOES NOT DRINK
HOW OFTEN DO YOU HAVE A DRINK CONTAINING ALCOHOL: NEVER

## 2023-07-20 ASSESSMENT — PAIN - FUNCTIONAL ASSESSMENT: PAIN_FUNCTIONAL_ASSESSMENT: 0-10

## 2023-07-20 ASSESSMENT — PAIN DESCRIPTION - LOCATION: LOCATION: ABDOMEN

## 2023-07-20 ASSESSMENT — PAIN DESCRIPTION - ORIENTATION: ORIENTATION: MID

## 2023-07-20 ASSESSMENT — PAIN DESCRIPTION - PAIN TYPE: TYPE: ACUTE PAIN

## 2024-03-26 ENCOUNTER — HOSPITAL ENCOUNTER (EMERGENCY)
Age: 24
Discharge: HOME | End: 2024-03-26
Attending: EMERGENCY MEDICINE
Payer: MEDICAID

## 2024-03-26 VITALS
HEART RATE: 67 BPM | DIASTOLIC BLOOD PRESSURE: 71 MMHG | HEIGHT: 60 IN | OXYGEN SATURATION: 99 % | RESPIRATION RATE: 18 BRPM | TEMPERATURE: 98.2 F | WEIGHT: 160 LBS | BODY MASS INDEX: 31.41 KG/M2 | SYSTOLIC BLOOD PRESSURE: 96 MMHG

## 2024-03-26 DIAGNOSIS — J06.9 ACUTE UPPER RESPIRATORY INFECTION: Primary | ICD-10-CM

## 2024-03-26 PROCEDURE — 99282 EMERGENCY DEPT VISIT SF MDM: CPT

## 2024-03-26 ASSESSMENT — ENCOUNTER SYMPTOMS
RHINORRHEA: 1
CHEST TIGHTNESS: 0
SHORTNESS OF BREATH: 0
FACIAL SWELLING: 0
COUGH: 1

## 2024-03-26 ASSESSMENT — PAIN - FUNCTIONAL ASSESSMENT: PAIN_FUNCTIONAL_ASSESSMENT: 0-10

## 2024-03-26 ASSESSMENT — PAIN SCALES - GENERAL: PAINLEVEL_OUTOF10: 4

## 2024-03-26 ASSESSMENT — PAIN DESCRIPTION - LOCATION: LOCATION: GENERALIZED

## 2024-03-26 NOTE — ED PROVIDER NOTES
[facility]  EMERGENCY DEPARTMENT ENCOUNTER      Pt Name: Rosalva Mahmood  MRN: [id number]  Birthdate 2000  Date of evaluation: 3/26/2024  Provider: Corby Felder MD    CHIEF COMPLAINT       Chief Complaint   Patient presents with    Cough     C/o cough, congestion, sore throat and headache since yesterday.          HISTORY OF PRESENT ILLNESS      Rosalva Mahmood is a 23 y.o. female who presents to the emergency department with cough runny nose and malaise for one day. She states she felt sick last night and it has progressed. No sob no NVD no known ill contacts.          REVIEW OF SYSTEMS       Review of Systems   HENT:  Positive for congestion and rhinorrhea. Negative for facial swelling and hearing loss.    Respiratory:  Positive for cough. Negative for chest tightness and shortness of breath.          PAST MEDICAL HISTORY     History reviewed. No pertinent past medical history.      SURGICAL HISTORY       Past Surgical History:   Procedure Laterality Date     SECTION           CURRENT MEDICATIONS       Previous Medications    No medications on file       ALLERGIES       Patient has no known allergies.    FAMILY HISTORY       History reviewed. No pertinent family history.       SOCIAL HISTORY       Social History     Tobacco Use    Smoking status: Every Day     Current packs/day: 0.25     Types: Cigarettes    Smokeless tobacco: Never   Vaping Use    Vaping Use: Never used   Substance Use Topics    Alcohol use: Not Currently    Drug use: Never         PHYSICAL EXAM       ED Triage Vitals [24 1805]   BP Temp Temp Source Pulse Respirations SpO2 Height Weight - Scale   96/71 98.2 °F (36.8 °C) Oral 67 18 99 % 1.524 m (5') 72.6 kg (160 lb)       Physical Exam  Constitutional:       Appearance: Normal appearance.   HENT:      Head: Normocephalic and atraumatic.      Nose: Congestion present.      Mouth/Throat:      Mouth: Mucous membranes are moist.   Cardiovascular:      Rate and

## 2024-04-03 ENCOUNTER — HOSPITAL ENCOUNTER (EMERGENCY)
Age: 24
Discharge: HOME | End: 2024-04-03
Attending: EMERGENCY MEDICINE
Payer: MEDICAID

## 2024-04-03 VITALS
HEART RATE: 68 BPM | BODY MASS INDEX: 32 KG/M2 | WEIGHT: 163 LBS | RESPIRATION RATE: 20 BRPM | HEIGHT: 60 IN | DIASTOLIC BLOOD PRESSURE: 74 MMHG | OXYGEN SATURATION: 95 % | SYSTOLIC BLOOD PRESSURE: 118 MMHG | TEMPERATURE: 99.3 F

## 2024-04-03 DIAGNOSIS — R11.2 NAUSEA VOMITING AND DIARRHEA: Primary | ICD-10-CM

## 2024-04-03 DIAGNOSIS — R19.7 NAUSEA VOMITING AND DIARRHEA: Primary | ICD-10-CM

## 2024-04-03 PROCEDURE — 99283 EMERGENCY DEPT VISIT LOW MDM: CPT

## 2024-04-03 RX ORDER — ONDANSETRON 4 MG/1
4 TABLET, ORALLY DISINTEGRATING ORAL EVERY 8 HOURS PRN
Qty: 10 TABLET | Refills: 0 | Status: SHIPPED | OUTPATIENT
Start: 2024-04-03

## 2024-04-03 ASSESSMENT — PAIN DESCRIPTION - PAIN TYPE: TYPE: ACUTE PAIN

## 2024-04-03 ASSESSMENT — PAIN - FUNCTIONAL ASSESSMENT: PAIN_FUNCTIONAL_ASSESSMENT: 0-10

## 2024-04-03 ASSESSMENT — LIFESTYLE VARIABLES
HOW OFTEN DO YOU HAVE A DRINK CONTAINING ALCOHOL: NEVER
HOW MANY STANDARD DRINKS CONTAINING ALCOHOL DO YOU HAVE ON A TYPICAL DAY: PATIENT DOES NOT DRINK

## 2024-04-03 ASSESSMENT — PAIN DESCRIPTION - LOCATION: LOCATION: ABDOMEN

## 2024-04-03 ASSESSMENT — PAIN SCALES - GENERAL: PAINLEVEL_OUTOF10: 3

## 2024-04-03 ASSESSMENT — PAIN DESCRIPTION - DESCRIPTORS: DESCRIPTORS: ACHING

## 2024-04-03 NOTE — DISCHARGE INSTRUCTIONS
Clear liquid diet.  Imodium as needed for diarrhea.  Zofran ODT as needed for nausea and vomiting symptoms.

## 2024-04-03 NOTE — ED PROVIDER NOTES
EMERGENCY DEPARTMENT ENCOUNTER      CHIEF COMPLAINT    Chief Complaint   Patient presents with    Emesis     C/o vomiting twice today. Also states she has diarrhea.       HPI    Rosalva Mahmood is a 23 y.o. female who presents to the emergency room for evaluation of nausea and vomiting and diarrhea that started last evening and is improved today.  She reports she has only vomited twice today.  No abdominal pain or fevers or chills.      PAST MEDICAL HISTORY    History reviewed. No pertinent past medical history.    SURGICAL HISTORY    Past Surgical History:   Procedure Laterality Date     SECTION         CURRENT MEDICATIONS        ALLERGIES    No Known Allergies    FAMILY HISTORY    History reviewed. No pertinent family history.    SOCIAL HISTORY    Social History     Socioeconomic History    Marital status: Single     Spouse name: None    Number of children: None    Years of education: None    Highest education level: None   Tobacco Use    Smoking status: Former     Current packs/day: 0.25     Types: Cigarettes    Smokeless tobacco: Never   Vaping Use    Vaping Use: Every day   Substance and Sexual Activity    Alcohol use: Not Currently    Drug use: Never    Sexual activity: Yes     Partners: Male     Review of Systems:    All relevant systems are reviewed and are negative with the exception of that stated in the HPI.    PHYSICAL EXAM    VITAL SIGNS: /74   Pulse 68   Temp 99.3 °F (37.4 °C) (Oral)   Resp 20   Ht 1.524 m (5')   Wt 73.9 kg (163 lb)   LMP 2024 (Exact Date)   SpO2 95%   BMI 31.83 kg/m²    Constitutional:  Well developed, Well nourished, No acute distress, Non-toxic appearance.   Eyes:  PERRL, EOMI, Conjunctiva normal, No discharge.   Respiratory:  Normal breath sounds, No respiratory distress, No wheezing, No chest tenderness.   Cardiovascular:  Normal heart rate, Normal rhythm, No murmurs, No rubs, No gallops.   GI:  Bowel sounds normal, Soft, No tenderness, No masses, No

## 2024-05-31 ENCOUNTER — INITIAL PRENATAL (OUTPATIENT)
Dept: OBGYN CLINIC | Age: 24
End: 2024-05-31
Payer: MEDICAID

## 2024-05-31 VITALS — WEIGHT: 158 LBS | DIASTOLIC BLOOD PRESSURE: 74 MMHG | BODY MASS INDEX: 30.86 KG/M2 | SYSTOLIC BLOOD PRESSURE: 116 MMHG

## 2024-05-31 DIAGNOSIS — Z98.891 HISTORY OF CESAREAN SECTION: ICD-10-CM

## 2024-05-31 DIAGNOSIS — N91.2 AMENORRHEA: Primary | ICD-10-CM

## 2024-05-31 DIAGNOSIS — O09.891 SHORT INTERVAL BETWEEN PREGNANCIES AFFECTING PREGNANCY IN FIRST TRIMESTER, ANTEPARTUM: ICD-10-CM

## 2024-05-31 DIAGNOSIS — O99.210 OBESITY IN PREGNANCY: ICD-10-CM

## 2024-05-31 PROCEDURE — 99213 OFFICE O/P EST LOW 20 MIN: CPT

## 2024-05-31 PROCEDURE — 1036F TOBACCO NON-USER: CPT

## 2024-05-31 PROCEDURE — G8427 DOCREV CUR MEDS BY ELIG CLIN: HCPCS

## 2024-05-31 PROCEDURE — G8419 CALC BMI OUT NRM PARAM NOF/U: HCPCS

## 2024-05-31 RX ORDER — PNV NO.95/FERROUS FUM/FOLIC AC 28MG-0.8MG
1 TABLET ORAL DAILY
Qty: 30 TABLET | Refills: 11 | Status: SHIPPED | OUTPATIENT
Start: 2024-05-31

## 2024-05-31 NOTE — PROGRESS NOTES
liver disease and have to limit fluids, talk with your doctor before you increase your fluids.  Avoid fish with high mercury.  This includes shark, swordfish, chavo mackerel, and marlin. It also includes orange roughy, bigeye tuna, and tilefish from the [place]. Instead, eat fish that is low in mercury. Choose canned light tuna, salmon, pollock, catfish, or shrimp.   Limit caffeine.  Things like coffee, tea, chocolate, and some sodas can contain caffeine. Caffeine can pass through breast milk to your baby. It may cause fussiness and sleep problems. Talk to your doctor about how much caffeine is safe for you.  Limit alcohol.  Alcohol can pass through breast milk to your baby. Talk to your doctor if you have questions about drinking alcohol while breastfeeding.  Be safe with supplements.  Talk with your doctor before taking any vitamins, minerals, and herbal or other dietary supplements.   When should you call for help?  Watch closely for changes in your health, and be sure to contact your doctor if you have any problems.  Where can you learn more?  Go to https://www.[employer].net/patientEd and enter P234 to learn more about \"Nutrition for Breastfeeding: Care Instructions.\"  Current as of: September 20, 2023               Content Version: 14.0  © 2006-2024 [employer].   Care instructions adapted under license by [employer]. If you have questions about a medical condition or this instruction, always ask your healthcare professional. [employer] disclaims any warranty or liability for your use of this information.       Patient Education       Nutrition During Pregnancy: Care Instructions  Overview     Healthy eating when you are pregnant is important for you and your baby. It can help you feel well and have a successful pregnancy and delivery. During pregnancy your nutrition needs increase. Even if you have excellent eating habits, your doctor may recommend a multivitamin to make

## 2024-05-31 NOTE — PATIENT INSTRUCTIONS
[facility]  Pharmacy  [address] · [phone]   [facility]  Pharmacy  [address] · [phone] [facility]  Pharmacy  [address] · [phone]   [facility]  Drug store  [address] ·   [phone]   [facility]  Pharmacy  [address] · [phone] [facility]  Grocery store  [address] · [phone] [facility]  Drug store  [address] · [phone]   [facility]  Pharmacy  [address] · [phone] [facility]  Pharmacy  [address] ·   [phone]     [facility]  Drug store  [address] · [phone]   [facility]  Drug store  [address] ·   [phone]  [facility]  Drug store   [address] ·   [phone]     [facility]  Pharmacy  [address] ·  [phone]  [facility]  Drug store  [address] ·   [phone]        *Please feel free to reach out to any other pharmacy in your area to see if they have the RSV vaccine available*

## 2024-06-02 ENCOUNTER — HOSPITAL ENCOUNTER (OUTPATIENT)
Age: 24
Discharge: HOME | End: 2024-06-02
Payer: MEDICAID

## 2024-06-02 DIAGNOSIS — O99.210 OBESITY IN PREGNANCY: ICD-10-CM

## 2024-06-02 DIAGNOSIS — N91.2 AMENORRHEA: ICD-10-CM

## 2024-06-02 LAB
ABO + RH BLD: NORMAL
AMPHET UR QL SCN: NEGATIVE
BARBITURATES UR QL SCN: NEGATIVE
BENZODIAZ UR QL: NEGATIVE
BLOOD GROUP ANTIBODIES SERPL: NEGATIVE
CANNABINOIDS UR QL SCN: NEGATIVE
COCAINE UR QL SCN: NEGATIVE
FENTANYL UR QL: NEGATIVE
GLUCOSE 1H P 50 G GLC PO SERPL-MCNC: 89 MG/DL (ref 70–135)
GLUCOSE ADMINISTRATION: NORMAL
METHADONE UR QL: NEGATIVE
OPIATES UR QL SCN: NEGATIVE
OXYCODONE UR QL SCN: NEGATIVE
PCP UR QL SCN: NEGATIVE
TEST INFORMATION: NORMAL

## 2024-06-02 PROCEDURE — 87389 HIV-1 AG W/HIV-1&-2 AB AG IA: CPT

## 2024-06-02 PROCEDURE — 82950 GLUCOSE TEST: CPT

## 2024-06-02 PROCEDURE — 80306 DRUG TEST PRSMV INSTRMNT: CPT

## 2024-06-02 PROCEDURE — 86850 RBC ANTIBODY SCREEN: CPT

## 2024-06-02 PROCEDURE — 86900 BLOOD TYPING SEROLOGIC ABO: CPT

## 2024-06-02 PROCEDURE — 86780 TREPONEMA PALLIDUM: CPT

## 2024-06-02 PROCEDURE — 86901 BLOOD TYPING SEROLOGIC RH(D): CPT

## 2024-06-02 PROCEDURE — 85025 COMPLETE CBC W/AUTO DIFF WBC: CPT

## 2024-06-02 PROCEDURE — 87340 HEPATITIS B SURFACE AG IA: CPT

## 2024-06-02 PROCEDURE — 87086 URINE CULTURE/COLONY COUNT: CPT

## 2024-06-02 PROCEDURE — 87491 CHLMYD TRACH DNA AMP PROBE: CPT

## 2024-06-02 PROCEDURE — 86803 HEPATITIS C AB TEST: CPT

## 2024-06-02 PROCEDURE — 36415 COLL VENOUS BLD VENIPUNCTURE: CPT

## 2024-06-02 PROCEDURE — 87591 N.GONORRHOEAE DNA AMP PROB: CPT

## 2024-06-02 PROCEDURE — 86762 RUBELLA ANTIBODY: CPT

## 2024-06-03 LAB
BASOPHILS # BLD: 0.02 K/UL (ref 0–0.2)
BASOPHILS NFR BLD: 0 % (ref 0–2)
EOSINOPHIL # BLD: 0.11 K/UL (ref 0–0.4)
EOSINOPHILS RELATIVE PERCENT: 1 % (ref 0–5)
ERYTHROCYTE [DISTWIDTH] IN BLOOD BY AUTOMATED COUNT: 13.1 % (ref 12.1–15.2)
HBV SURFACE AG SERPL QL IA: NONREACTIVE
HCT VFR BLD AUTO: 33.8 % (ref 36–46)
HCV AB SERPL QL IA: NONREACTIVE
HGB BLD-MCNC: 11.4 G/DL (ref 12–16)
HIV 1+2 AB+HIV1 P24 AG SERPL QL IA: NONREACTIVE
IMM GRANULOCYTES # BLD AUTO: 0.01 K/UL (ref 0–0.3)
IMM GRANULOCYTES NFR BLD: 0 % (ref 0–5)
LYMPHOCYTES NFR BLD: 2.03 K/UL (ref 1–4.8)
LYMPHOCYTES RELATIVE PERCENT: 27 % (ref 15–40)
MCH RBC QN AUTO: 29.9 PG (ref 26–34)
MCHC RBC AUTO-ENTMCNC: 33.7 G/DL (ref 31–37)
MCV RBC AUTO: 88.7 FL (ref 80–100)
MONOCYTES NFR BLD: 0.39 K/UL (ref 0–1)
MONOCYTES NFR BLD: 5 % (ref 4–8)
NEUTROPHILS NFR BLD: 67 % (ref 47–75)
NEUTS SEG NFR BLD: 5.1 K/UL (ref 2.5–7)
PLATELET # BLD AUTO: 274 K/UL (ref 140–450)
PMV BLD AUTO: 9.5 FL (ref 6–12)
RBC # BLD AUTO: 3.81 M/UL (ref 4–5.2)
RUBV IGG SERPL QL IA: 30.2 IU/ML
T PALLIDUM AB SER QL IA: NONREACTIVE
WBC OTHER # BLD: 7.7 K/UL (ref 3.5–11)

## 2024-06-04 LAB
CHLAMYDIA DNA UR QL NAA+PROBE: NEGATIVE
MICROORGANISM SPEC CULT: NORMAL
N GONORRHOEA DNA UR QL NAA+PROBE: NEGATIVE
SERVICE CMNT-IMP: NORMAL
SPECIMEN DESCRIPTION: NORMAL
SPECIMEN DESCRIPTION: NORMAL

## 2024-07-12 ENCOUNTER — ROUTINE PRENATAL (OUTPATIENT)
Dept: OBGYN CLINIC | Age: 24
End: 2024-07-12

## 2024-07-12 VITALS
BODY MASS INDEX: 31.83 KG/M2 | HEART RATE: 69 BPM | SYSTOLIC BLOOD PRESSURE: 122 MMHG | DIASTOLIC BLOOD PRESSURE: 76 MMHG | WEIGHT: 163 LBS

## 2024-07-12 DIAGNOSIS — Z34.92 NORMAL PREGNANCY IN SECOND TRIMESTER: Primary | ICD-10-CM

## 2024-07-12 DIAGNOSIS — Z98.891 HISTORY OF CESAREAN SECTION: ICD-10-CM

## 2024-07-12 DIAGNOSIS — Z3A.18 18 WEEKS GESTATION OF PREGNANCY: ICD-10-CM

## 2024-07-12 SDOH — ECONOMIC STABILITY: INCOME INSECURITY: HOW HARD IS IT FOR YOU TO PAY FOR THE VERY BASICS LIKE FOOD, HOUSING, MEDICAL CARE, AND HEATING?: NOT HARD AT ALL

## 2024-07-12 SDOH — ECONOMIC STABILITY: FOOD INSECURITY: WITHIN THE PAST 12 MONTHS, THE FOOD YOU BOUGHT JUST DIDN'T LAST AND YOU DIDN'T HAVE MONEY TO GET MORE.: NEVER TRUE

## 2024-07-12 SDOH — ECONOMIC STABILITY: HOUSING INSECURITY
IN THE LAST 12 MONTHS, WAS THERE A TIME WHEN YOU DID NOT HAVE A STEADY PLACE TO SLEEP OR SLEPT IN A SHELTER (INCLUDING NOW)?: NO

## 2024-07-12 ASSESSMENT — PATIENT HEALTH QUESTIONNAIRE - PHQ9
SUM OF ALL RESPONSES TO PHQ QUESTIONS 1-9: 0
2. FEELING DOWN, DEPRESSED OR HOPELESS: NOT AT ALL
SUM OF ALL RESPONSES TO PHQ9 QUESTIONS 1 & 2: 0
1. LITTLE INTEREST OR PLEASURE IN DOING THINGS: NOT AT ALL
SUM OF ALL RESPONSES TO PHQ QUESTIONS 1-9: 0

## 2024-07-12 NOTE — PROGRESS NOTES
Rosalva Mahmood is here at 18w6d for:    Chief Complaint   Patient presents with    Routine Prenatal Visit     Pt presents today for routine ob care. Pt was unable to  prenatals they said they did not have prescription. Told pt I would call and see what the issue is.        Estimated Due Date: Estimated Date of Delivery: 24    OB History    Para Term  AB Living   3 2 2     2   SAB IAB Ectopic Molar Multiple Live Births             2      # Outcome Date GA Lbr Louie/2nd Weight Sex Delivery Anes PTL Lv   3 Current            2 Term 09/15/23    F CS-Unspec   KRISTINA   1 Term 10/21/20    M CS-Unspec   KRISTINA      Complications: Failure to Progress in First Stage        Past Medical History:   Diagnosis Date    Abnormal Pap smear of cervix        Past Surgical History:   Procedure Laterality Date     SECTION         Social History     Tobacco Use   Smoking Status Former    Current packs/day: 0.25    Types: Cigarettes   Smokeless Tobacco Never        Social History     Substance and Sexual Activity   Alcohol Use Not Currently               HPI: Patient presents for a routine OB visit and declines concerns. She reports fetal movement is absent. She declines contractions, vaginal bleeding, and leaking of fluids.        PT denies fever, chills, nausea and vomiting       Vitals:  Estimated body mass index is 31.83 kg/m² as calculated from the following:    Height as of 24: 1.524 m (5').    Weight as of this encounter: 73.9 kg (163 lb).  BP: 122/76  Weight - Scale: 73.9 kg (163 lb)  Pulse: 69  Patient Position: Sitting  Fetal HR: 140  Movement: Absent  Presentation: Unknown           The patient is RH positive Rhogam Ordered no    T-Dap Vaccine (27-36 weeks): completed      Abdomen: soft, nontender    Results reviewed today:    No results found for this visit on 24.        ASSESSMENT & Plan    Diagnosis Orders   1. Normal pregnancy in second trimester        2. 18 weeks gestation of

## 2024-08-13 ENCOUNTER — HOSPITAL ENCOUNTER (OUTPATIENT)
Dept: ULTRASOUND IMAGING | Age: 24
Discharge: HOME | End: 2024-08-15
Payer: MEDICAID

## 2024-08-13 DIAGNOSIS — Z3A.18 18 WEEKS GESTATION OF PREGNANCY: ICD-10-CM

## 2024-08-13 PROCEDURE — 76811 OB US DETAILED SNGL FETUS: CPT

## 2024-09-17 ENCOUNTER — TELEPHONE (OUTPATIENT)
Dept: OBGYN | Age: 24
End: 2024-09-17

## 2024-09-17 DIAGNOSIS — R30.0 DYSURIA DURING PREGNANCY, ANTEPARTUM: Primary | ICD-10-CM

## 2024-09-17 DIAGNOSIS — O26.899 DYSURIA DURING PREGNANCY, ANTEPARTUM: Primary | ICD-10-CM

## 2024-09-20 ENCOUNTER — TELEPHONE (OUTPATIENT)
Dept: OBGYN | Age: 24
End: 2024-09-20

## 2024-09-25 ENCOUNTER — HOSPITAL ENCOUNTER (EMERGENCY)
Age: 24
Discharge: HOME | End: 2024-09-25
Attending: FAMILY MEDICINE
Payer: MEDICAID

## 2024-09-25 VITALS
BODY MASS INDEX: 34.55 KG/M2 | OXYGEN SATURATION: 97 % | DIASTOLIC BLOOD PRESSURE: 64 MMHG | HEIGHT: 60 IN | SYSTOLIC BLOOD PRESSURE: 116 MMHG | TEMPERATURE: 97.8 F | WEIGHT: 176 LBS | RESPIRATION RATE: 18 BRPM | HEART RATE: 78 BPM

## 2024-09-25 DIAGNOSIS — K64.4 EXTERNAL HEMORRHOID: Primary | ICD-10-CM

## 2024-09-25 PROCEDURE — 99283 EMERGENCY DEPT VISIT LOW MDM: CPT

## 2024-09-25 RX ORDER — IBUPROFEN 200 MG
200 TABLET ORAL EVERY 6 HOURS PRN
COMMUNITY

## 2024-09-25 ASSESSMENT — PAIN DESCRIPTION - LOCATION: LOCATION: RECTUM

## 2024-09-25 ASSESSMENT — LIFESTYLE VARIABLES
HOW MANY STANDARD DRINKS CONTAINING ALCOHOL DO YOU HAVE ON A TYPICAL DAY: PATIENT DOES NOT DRINK
HOW MANY STANDARD DRINKS CONTAINING ALCOHOL DO YOU HAVE ON A TYPICAL DAY: PATIENT DOES NOT DRINK
HOW OFTEN DO YOU HAVE A DRINK CONTAINING ALCOHOL: NEVER

## 2024-09-25 ASSESSMENT — PAIN DESCRIPTION - DESCRIPTORS: DESCRIPTORS: BURNING

## 2024-09-25 ASSESSMENT — PAIN - FUNCTIONAL ASSESSMENT: PAIN_FUNCTIONAL_ASSESSMENT: 0-10

## 2024-09-25 ASSESSMENT — PAIN SCALES - GENERAL: PAINLEVEL_OUTOF10: 7

## 2024-09-25 ASSESSMENT — PAIN DESCRIPTION - PAIN TYPE: TYPE: ACUTE PAIN

## 2024-09-25 ASSESSMENT — ENCOUNTER SYMPTOMS
RECTAL PAIN: 1
BLOOD IN STOOL: 0

## 2024-09-27 ENCOUNTER — HOSPITAL ENCOUNTER (EMERGENCY)
Age: 24
Discharge: HOME | End: 2024-09-27
Attending: EMERGENCY MEDICINE
Payer: MEDICAID

## 2024-09-27 VITALS
DIASTOLIC BLOOD PRESSURE: 61 MMHG | OXYGEN SATURATION: 97 % | RESPIRATION RATE: 18 BRPM | BODY MASS INDEX: 34.32 KG/M2 | WEIGHT: 174.8 LBS | SYSTOLIC BLOOD PRESSURE: 112 MMHG | HEART RATE: 87 BPM | TEMPERATURE: 97.6 F | HEIGHT: 60 IN

## 2024-09-27 DIAGNOSIS — K64.4 EXTERNAL HEMORRHOID, BLEEDING: Primary | ICD-10-CM

## 2024-09-27 DIAGNOSIS — Z3A.30 30 WEEKS GESTATION OF PREGNANCY: ICD-10-CM

## 2024-09-27 PROCEDURE — 99282 EMERGENCY DEPT VISIT SF MDM: CPT

## 2024-09-27 ASSESSMENT — PAIN - FUNCTIONAL ASSESSMENT: PAIN_FUNCTIONAL_ASSESSMENT: NONE - DENIES PAIN

## 2024-09-28 NOTE — ED PROVIDER NOTES
Types: Cigarettes    Smokeless tobacco: Never   Vaping Use    Vaping status: Every Day    Substances: Nicotine, Flavoring   Substance and Sexual Activity    Alcohol use: Not Currently    Drug use: Never    Sexual activity: Yes     Partners: Male     Social Determinants of Health     Financial Resource Strain: Low Risk  (9/25/2024)    Received from [facility]    Overall Financial Resource Strain (CARDIA)     Difficulty of Paying Living Expenses: Not hard at all   Food Insecurity: No Food Insecurity (9/25/2024)    Received from [facility]    Hunger Vital Sign     Worried About Running Out of Food in the Last Year: Never true     Ran Out of Food in the Last Year: Never true   Transportation Needs: No Transportation Needs (9/25/2024)    Received from [facility]    PRAPARE - Transportation     Lack of Transportation (Medical): No     Lack of Transportation (Non-Medical): No   Physical Activity: Sufficiently Active (9/25/2024)    Received from [facility]    Exercise Vital Sign     Days of Exercise per Week: 3 days     Minutes of Exercise per Session: 60 min   Stress: No Stress Concern Present (9/25/2024)    Received from [facility]    [place] [employer] - Occupational Stress Questionnaire     Feeling of Stress : Only a little   Social Connections: Socially Isolated (9/25/2024)    Received from [facility]    Social Connection and Isolation Panel [NHANES]     Frequency of Communication with Friends and Family: More than three times a week     Frequency of Social Gatherings with Friends and Family: Three times a week     Attends Mandaen Services: Never     Active Member of Clubs or Organizations: No     Attends Club or Organization Meetings: Never     Marital Status: Never    Intimate Partner Violence: Not At Risk (9/25/2024)    Received from [facility]    Humiliation, Afraid, Rape, and Kick questionnaire     Fear of Current or Ex-Partner: No     Emotionally

## 2024-11-04 ENCOUNTER — HOSPITAL ENCOUNTER (OUTPATIENT)
Age: 24
Discharge: HOME | End: 2024-11-04
Payer: MEDICAID

## 2024-11-04 LAB
GLUCOSE 1H P 50 G GLC PO SERPL-MCNC: 182 MG/DL (ref 70–135)
GLUCOSE ADMINISTRATION: ABNORMAL
HCT VFR BLD AUTO: 28.2 % (ref 36–46)
HGB BLD-MCNC: 9.2 G/DL (ref 12–16)

## 2024-11-04 PROCEDURE — 85018 HEMOGLOBIN: CPT

## 2024-11-04 PROCEDURE — 85014 HEMATOCRIT: CPT

## 2024-11-04 PROCEDURE — 36415 COLL VENOUS BLD VENIPUNCTURE: CPT

## 2024-11-04 PROCEDURE — 82950 GLUCOSE TEST: CPT

## 2024-11-24 ENCOUNTER — HOSPITAL ENCOUNTER (OUTPATIENT)
Age: 24
Discharge: HOME | End: 2024-11-24
Payer: MEDICAID

## 2024-11-24 LAB
AMOUNT GLUCOSE GIVEN: 100 G
GLUCOSE 2H P 100 G GLC PO SERPL-MCNC: 92 MG/DL (ref 65–94)

## 2024-11-24 PROCEDURE — 82947 ASSAY GLUCOSE BLOOD QUANT: CPT

## 2024-11-24 PROCEDURE — 82952 GTT-ADDED SAMPLES: CPT

## 2024-11-24 PROCEDURE — 36415 COLL VENOUS BLD VENIPUNCTURE: CPT

## 2024-11-24 PROCEDURE — 82951 GLUCOSE TOLERANCE TEST (GTT): CPT

## 2024-11-27 ENCOUNTER — TELEPHONE (OUTPATIENT)
Dept: NURSING | Age: 24
End: 2024-11-27

## 2024-11-27 NOTE — TELEPHONE ENCOUNTER
Pt did not arrive for scheduled Injectafer infusion on 11/27/2024. Writer attempted to call pt to verify if she planned on coming for appointment, but voicemailbox was not set up.

## 2025-02-20 ENCOUNTER — HOSPITAL ENCOUNTER (EMERGENCY)
Age: 25
Discharge: HOME | End: 2025-02-20
Attending: EMERGENCY MEDICINE
Payer: MEDICAID

## 2025-02-20 VITALS
BODY MASS INDEX: 32.39 KG/M2 | SYSTOLIC BLOOD PRESSURE: 123 MMHG | WEIGHT: 165 LBS | HEIGHT: 60 IN | RESPIRATION RATE: 18 BRPM | HEART RATE: 77 BPM | OXYGEN SATURATION: 100 % | TEMPERATURE: 98.2 F | DIASTOLIC BLOOD PRESSURE: 79 MMHG

## 2025-02-20 DIAGNOSIS — R10.9 ABDOMINAL WALL PAIN: Primary | ICD-10-CM

## 2025-02-20 PROCEDURE — 99283 EMERGENCY DEPT VISIT LOW MDM: CPT

## 2025-02-20 RX ORDER — NAPROXEN 500 MG/1
500 TABLET ORAL 2 TIMES DAILY
Qty: 60 TABLET | Refills: 0 | Status: SHIPPED | OUTPATIENT
Start: 2025-02-20

## 2025-02-20 ASSESSMENT — ENCOUNTER SYMPTOMS
ABDOMINAL DISTENTION: 0
ABDOMINAL PAIN: 1
CHEST TIGHTNESS: 0

## 2025-02-20 ASSESSMENT — PAIN SCALES - GENERAL: PAINLEVEL_OUTOF10: 3

## 2025-02-20 ASSESSMENT — PAIN DESCRIPTION - ORIENTATION: ORIENTATION: RIGHT

## 2025-02-20 ASSESSMENT — PAIN DESCRIPTION - PAIN TYPE: TYPE: ACUTE PAIN

## 2025-02-20 ASSESSMENT — PAIN DESCRIPTION - DESCRIPTORS: DESCRIPTORS: SHARP

## 2025-02-20 ASSESSMENT — PAIN DESCRIPTION - LOCATION: LOCATION: ABDOMEN;RIB CAGE

## 2025-02-20 ASSESSMENT — PAIN - FUNCTIONAL ASSESSMENT: PAIN_FUNCTIONAL_ASSESSMENT: ACTIVITIES ARE NOT PREVENTED

## 2025-02-21 NOTE — ED PROVIDER NOTES
[facility]  EMERGENCY DEPARTMENT ENCOUNTER      Pt Name: Rosalva Mahmood  MRN: [id number]  Birthdate 2000  Date of evaluation: 2025  Provider: Corby Felder MD    CHIEF COMPLAINT       Chief Complaint   Patient presents with    Abdominal Pain     Right sided abd pain that started yesterday around 1500. States that it feels like there is a \"ball or a bubble\" in there that moves around when she is moving. States it hurts more when she takes deep breaths. Also c/o diarrhea that started today.         HISTORY OF PRESENT ILLNESS      Rosalva Mahmood is a 24 y.o. female who presents to the emergency department pt with right sided upper abd pain. Started today is worse with certain movements does not radiate it is not changed with eating. No fevers NVD.  Worse with bending over.          REVIEW OF SYSTEMS       Review of Systems   Constitutional:  Negative for fever.   HENT:  Negative for dental problem.    Respiratory:  Negative for chest tightness.    Cardiovascular:  Negative for chest pain and palpitations.   Gastrointestinal:  Positive for abdominal pain. Negative for abdominal distention.         PAST MEDICAL HISTORY     Past Medical History:   Diagnosis Date    Abnormal Pap smear of cervix          SURGICAL HISTORY       Past Surgical History:   Procedure Laterality Date     SECTION           CURRENT MEDICATIONS       Previous Medications    IBUPROFEN (ADVIL;MOTRIN) 200 MG TABLET    Take 1 tablet by mouth every 6 hours as needed for Pain    LIDOCAINE-HYDROCORTISONE ACE 3-0.5 % CREA    Apply small amout to hemorroids twice daily as needed for no more then one week Pharmacist: May substitute as needed for insurance reasons    ONDANSETRON (ZOFRAN-ODT) 4 MG DISINTEGRATING TABLET    Place 1 tablet under the tongue every 8 hours as needed for Nausea or Vomiting    PRENATAL VIT-FE FUMARATE-FA (PRENATAL VITAMINS) 28-0.8 MG TABS    Take 1 tablet by mouth daily       ALLERGIES       Patient